# Patient Record
Sex: FEMALE | Race: WHITE | URBAN - METROPOLITAN AREA
[De-identification: names, ages, dates, MRNs, and addresses within clinical notes are randomized per-mention and may not be internally consistent; named-entity substitution may affect disease eponyms.]

---

## 2017-02-09 ENCOUNTER — OFFICE VISIT (OUTPATIENT)
Dept: FAMILY MEDICINE CLINIC | Age: 47
End: 2017-02-09

## 2017-02-09 VITALS
DIASTOLIC BLOOD PRESSURE: 100 MMHG | BODY MASS INDEX: 29.5 KG/M2 | HEIGHT: 64 IN | SYSTOLIC BLOOD PRESSURE: 142 MMHG | TEMPERATURE: 97.8 F | HEART RATE: 81 BPM | OXYGEN SATURATION: 97 % | WEIGHT: 172.8 LBS | RESPIRATION RATE: 18 BRPM

## 2017-02-09 DIAGNOSIS — I10 ESSENTIAL HYPERTENSION WITH GOAL BLOOD PRESSURE LESS THAN 130/80: Primary | ICD-10-CM

## 2017-02-09 DIAGNOSIS — G43.109 MIGRAINE WITH VERTIGO: ICD-10-CM

## 2017-02-09 NOTE — PROGRESS NOTES
HISTORY OF PRESENT ILLNESS  Carmine Roblero is a 51 y.o. female. HPI Comments: Patient  Mentions that her blood pressure has been up lately due to life style changes. She mentions she just finished school and she has not been having much activity and has had 2 occasions where her blood pressure was high. She will make some changes to her life style and monitor her blood pressure. She mentions she has been having migraines most likely due to her bp being high. Hypertension    The history is provided by the patient. This is a chronic problem. The problem has been gradually worsening. Associated symptoms include headaches. Pertinent negatives include no chest pain, no orthopnea, no palpitations, no anxiety, no confusion, no malaise/fatigue, no blurred vision, no tinnitus, no neck pain, no peripheral edema, no dizziness, no shortness of breath, no nausea and no vomiting. Risk factors include hypertension, stress and male gender. Migraine    The history is provided by the patient. This is a chronic problem. The problem occurs constantly. The problem has been gradually worsening. The headache is aggravated by loud noise. The pain is located in the frontal and bilateral region. The quality of the pain is described as sharp and dull. The pain is at a severity of 3/10. Pertinent negatives include no anorexia, no fever, no malaise/fatigue, no chest pressure, no orthopnea, no palpitations, no shortness of breath, no tingling, no dizziness, no nausea and no vomiting. She has tried NSAIDs for the symptoms. Review of Systems   Constitutional: Negative for fever and malaise/fatigue. Weight gain since last visit. HENT: Negative for congestion, ear pain, sore throat and tinnitus. Eyes: Positive for redness. Negative for blurred vision and double vision. Respiratory: Negative for cough, sputum production, shortness of breath and wheezing.     Cardiovascular: Negative for chest pain, palpitations, orthopnea and leg swelling. Gastrointestinal: Negative for abdominal pain, anorexia, constipation, diarrhea, nausea and vomiting. Genitourinary: Negative for dysuria, hematuria and urgency. Musculoskeletal: Negative for joint pain and neck pain. Neurological: Positive for headaches. Negative for dizziness, tingling and focal weakness. Psychiatric/Behavioral: Negative for confusion and depression. The patient is not nervous/anxious. Visit Vitals    BP (!) 142/100    Pulse 81    Temp 97.8 °F (36.6 °C) (Oral)    Resp 18    Ht 5' 4\" (1.626 m)    Wt 172 lb 12.8 oz (78.4 kg)    SpO2 97%    BMI 29.66 kg/m2       Physical Exam   Constitutional: She is oriented to person, place, and time. She appears well-developed and well-nourished. No distress. HENT:   Head: Normocephalic and atraumatic. Right Ear: External ear normal.   Left Ear: External ear normal.   Mouth/Throat: Oropharynx is clear and moist.   Eyes: EOM are normal. Pupils are equal, round, and reactive to light. No scleral icterus. Neck: Normal range of motion. No thyromegaly present. Cardiovascular: Normal rate, regular rhythm and normal heart sounds. Pulmonary/Chest: Effort normal and breath sounds normal. No respiratory distress. She has no wheezes. Abdominal: Soft. Bowel sounds are normal.   Lymphadenopathy:     She has no cervical adenopathy. Neurological: She is alert and oriented to person, place, and time. Psychiatric: She has a normal mood and affect. ASSESSMENT and PLAN  Hypertension :  1) Goal blood pressure less than equal to 140/90 mmHg, goal BP can vary depending on risk factors as discussed. 2) Lifestyle modifications discussed with patient, low sodium <2 gm, low salt , DASH diet  3) Exercise for at least 30 min 3-5 times a week for goal BMI of less than or equal  To 25.  4) Continue current medications as prescribed.   5) Please begin medication as discussed for better blood pressure control, explained side effects and patient verbalized understanding. 6) Goal LDL<100.  7) Please monitor your blood pressure and keep a log to bring in with you at each visit. 8) Discussed risk factors with patient such as CAD, FAST of stroke symptoms, pt verbalizes understanding. 9) Please avoid smoking , alcohol and any illicit drug use if applicable to you.

## 2017-04-06 ENCOUNTER — OFFICE VISIT (OUTPATIENT)
Dept: FAMILY MEDICINE CLINIC | Age: 47
End: 2017-04-06

## 2017-04-06 VITALS
SYSTOLIC BLOOD PRESSURE: 116 MMHG | DIASTOLIC BLOOD PRESSURE: 78 MMHG | HEIGHT: 64 IN | OXYGEN SATURATION: 98 % | RESPIRATION RATE: 20 BRPM | WEIGHT: 173 LBS | TEMPERATURE: 97.8 F | BODY MASS INDEX: 29.53 KG/M2 | HEART RATE: 98 BPM

## 2017-04-06 DIAGNOSIS — I10 ESSENTIAL HYPERTENSION WITH GOAL BLOOD PRESSURE LESS THAN 130/80: Primary | ICD-10-CM

## 2017-04-06 NOTE — PROGRESS NOTES
Chief Complaint   Patient presents with    Hypertension     1. Have you been to the ER, urgent care clinic since your last visit? Hospitalized since your last visit? No    2. Have you seen or consulted any other health care providers outside of the [address] [place] [address] since your last visit? Include any pap smears or colon screening.  No

## 2017-04-06 NOTE — PROGRESS NOTES
HISTORY OF PRESENT ILLNESS  Dora Oh is a 51 y.o. female. HPI Comments: Patient is here for blood pressure follow up. She has been taking her medications and her blood pressure is very good today. Her blood pressures  With her meter has been 120/80 mmhg. Patient has gained some weight and she was inactive for some time and now she is active with a new job. Patient mentions today she has not had caffeine and her allergy medication. Hypertension    The history is provided by the patient. This is a chronic problem. The current episode started less than 1 hour ago. The problem has been gradually improving. Pertinent negatives include no chest pain, no palpitations, no anxiety, no confusion, no malaise/fatigue, no blurred vision, no headaches, no tinnitus, no peripheral edema, no dizziness, no shortness of breath, no nausea and no vomiting. Risk factors include stress and hypertension. Review of Systems   Constitutional: Negative for chills, fever and malaise/fatigue. HENT: Negative for congestion, ear discharge, sore throat and tinnitus. Eyes: Negative for blurred vision, double vision, pain and discharge. Respiratory: Negative for cough, sputum production, shortness of breath and wheezing. Cardiovascular: Negative for chest pain, palpitations and leg swelling. Gastrointestinal: Negative for abdominal pain, diarrhea, nausea and vomiting. Genitourinary: Negative for dysuria, frequency and hematuria. Musculoskeletal: Negative for joint pain and myalgias. Neurological: Negative for dizziness, tingling, focal weakness, weakness and headaches. Endo/Heme/Allergies: Positive for environmental allergies. Psychiatric/Behavioral: Negative for confusion. The patient is not nervous/anxious.       Visit Vitals    /78    Pulse 98    Temp 97.8 °F (36.6 °C) (Oral)    Resp 20    Ht 5' 4\" (1.626 m)    Wt 173 lb (78.5 kg)    SpO2 98%    BMI 29.7 kg/m2       Physical Exam Constitutional: She is oriented to person, place, and time. She appears well-developed and well-nourished. No distress. HENT:   Head: Normocephalic and atraumatic. Right Ear: External ear normal.   Left Ear: External ear normal.   Eyes: EOM are normal. Pupils are equal, round, and reactive to light. No scleral icterus. Neck: Normal range of motion. No thyromegaly present. Cardiovascular: Normal rate, regular rhythm and normal heart sounds. Pulmonary/Chest: Effort normal and breath sounds normal. No respiratory distress. She has no wheezes. Abdominal: Soft. Bowel sounds are normal. She exhibits no distension. There is no tenderness. Lymphadenopathy:     She has no cervical adenopathy. Neurological: She is alert and oriented to person, place, and time. Psychiatric: She has a normal mood and affect. ASSESSMENT and PLAN  Hypertension :  1) Goal blood pressure less than equal to 140/90 mmHg, goal BP can vary depending on risk factors as discussed. 2) Lifestyle modifications discussed with patient, low sodium <2 gm, low salt , DASH diet  3) Exercise for at least 30 min 3-5 times a week for goal BMI of less than or equal  To 25.  4) Continue current medications as prescribed. 5) Please begin medication as discussed for better blood pressure control, explained side effects and patient verbalized understanding. 6) Goal LDL<100.  7) Please monitor your blood pressure and keep a log to bring in with you at each visit. 8) Discussed risk factors with patient such as CAD, FAST of stroke symptoms, pt verbalizes understanding. 9) Please avoid smoking , alcohol and any illicit drug use if applicable to you.

## 2017-04-24 RX ORDER — PROPRANOLOL HYDROCHLORIDE 60 MG/1
CAPSULE, EXTENDED RELEASE ORAL
Qty: 30 CAP | Refills: 4 | Status: SHIPPED | OUTPATIENT
Start: 2017-04-24 | End: 2017-09-27 | Stop reason: SDUPTHER

## 2017-08-03 ENCOUNTER — OFFICE VISIT (OUTPATIENT)
Dept: FAMILY MEDICINE CLINIC | Age: 47
End: 2017-08-03

## 2017-08-03 VITALS
RESPIRATION RATE: 18 BRPM | HEIGHT: 64 IN | BODY MASS INDEX: 29.71 KG/M2 | TEMPERATURE: 97 F | OXYGEN SATURATION: 96 % | DIASTOLIC BLOOD PRESSURE: 75 MMHG | SYSTOLIC BLOOD PRESSURE: 134 MMHG | WEIGHT: 174 LBS | HEART RATE: 71 BPM

## 2017-08-03 DIAGNOSIS — K21.9 GASTROESOPHAGEAL REFLUX DISEASE WITHOUT ESOPHAGITIS: ICD-10-CM

## 2017-08-03 DIAGNOSIS — Z00.00 PHYSICAL EXAM: Primary | ICD-10-CM

## 2017-08-03 DIAGNOSIS — Z13.21 SCREENING FOR ENDOCRINE, NUTRITIONAL, METABOLIC AND IMMUNITY DISORDER: ICD-10-CM

## 2017-08-03 DIAGNOSIS — Z13.0 SCREENING FOR ENDOCRINE, NUTRITIONAL, METABOLIC AND IMMUNITY DISORDER: ICD-10-CM

## 2017-08-03 DIAGNOSIS — I10 ESSENTIAL HYPERTENSION WITH GOAL BLOOD PRESSURE LESS THAN 130/80: ICD-10-CM

## 2017-08-03 DIAGNOSIS — Z13.228 SCREENING FOR ENDOCRINE, NUTRITIONAL, METABOLIC AND IMMUNITY DISORDER: ICD-10-CM

## 2017-08-03 DIAGNOSIS — Z13.29 SCREENING FOR ENDOCRINE, NUTRITIONAL, METABOLIC AND IMMUNITY DISORDER: ICD-10-CM

## 2017-08-03 NOTE — PROGRESS NOTES
HISTORY OF PRESENT ILLNESS  Cortez Boogie is a 51 y.o. female. HPI Comments: Patient is here for  A physical exam. She does see an eye doctor and dentist. She is currently on her period. She has had her PAP smear done some time this year. She is not yet due for a mammogram. She is due to have some blood work done and will [facility]. Her blood pressure is nicely controlled and she mentions at present her heartburn bothers her. I will also order h.pylori testing. Complete Physical   The history is provided by the patient. This is a new problem. The problem occurs constantly. The problem has not changed since onset. Pertinent negatives include no chest pain, no abdominal pain, no headaches and no shortness of breath. Nothing aggravates the symptoms. Nothing relieves the symptoms. She has tried nothing for the symptoms. Patient Active Problem List   Diagnosis Code    Advance care planning Z71.89    Essential hypertension with goal blood pressure less than 130/80 I10    Gastroesophageal reflux disease without esophagitis K21.9    Intractable migraine with aura without status migrainosus G43.119    Migraine with vertigo G43. 109    Intractable migraine without aura and without status migrainosus G43.019     Current Outpatient Prescriptions on File Prior to Visit   Medication Sig Dispense Refill    propranolol LA (INDERAL LA) 60 mg SR capsule TAKE 1 CAPSULE BY MOUTH DAILY 30 Cap 4    pantoprazole (PROTONIX) 40 mg tablet Take 40 mg by mouth daily.  cyclobenzaprine (FLEXERIL) 10 mg tablet Take 0.5 Tabs by mouth nightly. 10 Tab 0    ranitidine (ZANTAC) 150 mg tablet Take 1 Tab by mouth nightly. Indications: GASTROESOPHAGEAL REFLUX 30 Tab 6    esomeprazole (NEXIUM) 20 mg capsule Take  by mouth daily.  MULTIVITS,CA,MINERALS/IRON/FA (MULTIVIT-IRON-FA-CALCIUM-MINS PO) Take  by mouth.  SUMAtriptan (IMITREX) 50 mg tablet Take 50 mg by mouth once as needed for Migraine.       ibuprofen (MOTRIN) 400 mg tablet Take  by mouth every six (6) hours as needed for Pain.  multivitamin (ONE A DAY) tablet Take 1 tablet by mouth daily.  loratadine (CLARITIN) 10 mg tablet Take 10 mg by mouth daily. No current facility-administered medications on file prior to visit. No Known Allergies  Past Medical History:   Diagnosis Date    GERD (gastroesophageal reflux disease)     Migraine     Vascular anomaly     left sided vascular loop    Vertigo      Past Surgical History:   Procedure Laterality Date    HX REFRACTIVE SURGERY       Family History   Problem Relation Age of Onset    Cancer Mother     Heart Disease Father     Cancer Sister      Social History     Social History    Marital status: SINGLE     Spouse name: N/A    Number of children: N/A    Years of education: N/A     Occupational History    Not on file. Social History Main Topics    Smoking status: Never Smoker    Smokeless tobacco: Never Used    Alcohol use No    Drug use: No    Sexual activity: Not Currently     Birth control/ protection: None     Other Topics Concern    Not on file     Social History Narrative       Review of Systems   Constitutional: Negative for chills, diaphoresis, fever and malaise/fatigue. HENT: Negative for congestion, ear pain and sore throat. Eyes: Negative for blurred vision, double vision, pain and discharge. Respiratory: Negative for cough, sputum production, shortness of breath and wheezing. Cardiovascular: Negative for chest pain, palpitations and leg swelling. Gastrointestinal: Positive for heartburn. Negative for abdominal pain, blood in stool, diarrhea, nausea and vomiting. Genitourinary: Negative for dysuria and frequency. Musculoskeletal: Negative for back pain, joint pain and myalgias. Neurological: Negative for dizziness, tingling, weakness and headaches. Psychiatric/Behavioral: The patient is not nervous/anxious and does not have insomnia. Visit Vitals    /75 (BP 1 Location: Left arm, BP Patient Position: Sitting)    Pulse 71    Temp 97 °F (36.1 °C) (Oral)    Resp 18    Ht 5' 4\" (1.626 m)    Wt 174 lb (78.9 kg)    SpO2 96%    BMI 29.87 kg/m2       Physical Exam   Constitutional: She is oriented to person, place, and time. She appears well-developed and well-nourished. No distress. HENT:   Head: Normocephalic and atraumatic. Right Ear: External ear normal.   Left Ear: External ear normal.   Mouth/Throat: Oropharynx is clear and moist. No oropharyngeal exudate. Eyes: EOM are normal. Pupils are equal, round, and reactive to light. Neck: Normal range of motion. No thyromegaly present. Cardiovascular: Normal rate, regular rhythm and normal heart sounds. Pulmonary/Chest: Effort normal and breath sounds normal. No respiratory distress. She has no wheezes. Abdominal: Soft. Bowel sounds are normal. There is no tenderness. Musculoskeletal: Normal range of motion. Lymphadenopathy:     She has no cervical adenopathy. Neurological: She is alert and oriented to person, place, and time. Psychiatric: She has a normal mood and affect. ASSESSMENT and PLAN  Physical exam :  1) Please make sure you have a routine physical exam every 1-2 years. 2) Annual check up with eye doctor and dentist.  3) Annual mammograms for all females starting at age of 36.  3) Self breast exam every month starting at age of 21 and above. 5) Clinical breast exam to be done every 3 years for woman between 20-30 and every year for all woman 36 and above. 6) Pap smear every 3 years starting at age 24( between 24- 27 it may be more often). At the age of 27 to 72  can switch to every 5 years with HPV screening. Woman over the age of 72 with regular cervical cancer testing with normal results no longer need testing. 7) Colorectal cancer screening with colonoscopy every ten years.    8) Bone density testing starting at the age of 72.   5) Routine blood work to be ordered as part of physical exam and has been discussed with patient. 10) Screening for STD's/HIV. 11) Exercise at least 30 min 3-5 times a week for goal BMI of less than or equal to 25.  12) Please make sure you wear a seat belt while driving daily , helmet safety discussed. 13) Please avoid smoking , alcohol and illicit drug use. 14) Daily requirement of calcium is 1200 mg per day and 1000 IU of vitamin D.  15) Please make sure all immunizations are up to date:       - Influenza vaccine every year        - Tdap every 10 years       - Pneumococcal vaccine starting at age of 72       - Shingles at age 61     Gerd:  - Avoid fatty or greasy foods, chocalate , caffeine, mints , or mint-flavored foods,spicy foods, citrus and tomato -based foods.  -Do not lie down right after eating food at any time.  -Do no eat within 3 hours of bedtime.  - Elevate head end of bed .  - Small , frequent meals  through out the day, do no eat large meals.  -Avoid alcohol , smoking.  - Weight loss   - Continue medication as prescribed.

## 2017-08-03 NOTE — PROGRESS NOTES
Patient is here for complete physical.  1. Have you been to the ER, urgent care clinic since your last visit? Hospitalized since your last visit?no    2. Have you seen or consulted any other health care providers outside of the [address] [place] [address] since your last visit? Include any pap smears or colon screening.  no

## 2017-08-06 DIAGNOSIS — K21.9 GASTROESOPHAGEAL REFLUX DISEASE WITHOUT ESOPHAGITIS: ICD-10-CM

## 2017-08-09 RX ORDER — RANITIDINE 150 MG/1
TABLET, FILM COATED ORAL
Qty: 30 TAB | Refills: 4 | Status: SHIPPED | OUTPATIENT
Start: 2017-08-09 | End: 2019-01-28 | Stop reason: SDUPTHER

## 2017-08-12 LAB
25(OH)D3 SERPL-MCNC: 32.3 NG/ML (ref 32–100)
A-G RATIO,AGRAT: 1.4 RATIO (ref 1.1–2.6)
ABSOLUTE LYMPHOCYTE COUNT, 10803: 2.1 K/UL (ref 1–4.8)
ALBUMIN SERPL-MCNC: 4.3 G/DL (ref 3.5–5)
ALP SERPL-CCNC: 77 U/L (ref 25–115)
ALT SERPL-CCNC: 25 U/L (ref 5–40)
ANION GAP SERPL CALC-SCNC: 16 MMOL/L
AST SERPL W P-5'-P-CCNC: 18 U/L (ref 10–37)
BASOPHILS # BLD: 0 K/UL (ref 0–0.2)
BASOPHILS NFR BLD: 1 % (ref 0–2)
BILIRUB SERPL-MCNC: 0.5 MG/DL (ref 0.2–1.2)
BUN SERPL-MCNC: 10 MG/DL (ref 6–22)
CALCIUM SERPL-MCNC: 9.5 MG/DL (ref 8.4–10.5)
CHLORIDE SERPL-SCNC: 98 MMOL/L (ref 98–110)
CHOLEST SERPL-MCNC: 205 MG/DL (ref 110–200)
CO2 SERPL-SCNC: 27 MMOL/L (ref 20–32)
CREAT SERPL-MCNC: 0.8 MG/DL (ref 0.5–1.2)
EOSINOPHIL # BLD: 0.1 K/UL (ref 0–0.5)
EOSINOPHIL NFR BLD: 2 % (ref 0–6)
ERYTHROCYTE [DISTWIDTH] IN BLOOD BY AUTOMATED COUNT: 13.5 % (ref 10–16)
GFRAA, 66117: >60
GFRNA, 66118: >60
GLOBULIN,GLOB: 3 G/DL (ref 2–4)
GLUCOSE SERPL-MCNC: 93 MG/DL (ref 65–99)
GRANULOCYTES,GRANS: 64 % (ref 40–75)
HCT VFR BLD AUTO: 44.5 % (ref 35.1–48)
HDLC SERPL-MCNC: 47 MG/DL (ref 40–59)
HGB BLD-MCNC: 14.3 G/DL (ref 11.7–16)
LDLC SERPL CALC-MCNC: 133 MG/DL (ref 50–99)
LYMPHOCYTES, LYMLT: 27 % (ref 27–45)
MCH RBC QN AUTO: 30 PG (ref 26–34)
MCHC RBC AUTO-ENTMCNC: 32 G/DL (ref 32–36)
MCV RBC AUTO: 93 FL (ref 80–95)
MONOCYTES # BLD: 0.5 K/UL (ref 0.1–0.9)
MONOCYTES NFR BLD: 6 % (ref 3–9)
NEUTROPHILS # BLD AUTO: 5 K/UL (ref 1.8–7.7)
PLATELET # BLD AUTO: 330 K/UL (ref 140–440)
PMV BLD AUTO: 10.6 FL (ref 6–10.8)
POTASSIUM SERPL-SCNC: 4.4 MMOL/L (ref 3.5–5.5)
PROT SERPL-MCNC: 7.3 G/DL (ref 6.4–8.3)
RBC # BLD AUTO: 4.78 M/UL (ref 3.8–5.2)
SODIUM SERPL-SCNC: 141 MMOL/L (ref 133–145)
TRIGL SERPL-MCNC: 124 MG/DL (ref 40–149)
TSH SERPL DL<=0.005 MIU/L-ACNC: 4.18 MCU/ML (ref 0.27–4.2)
VLDLC SERPL CALC-MCNC: 25 MG/DL (ref 8–30)
WBC # BLD AUTO: 7.7 K/UL (ref 4–11)

## 2017-08-14 LAB — H PYLORI AB, IGG,3341A: <0.9 INDEX

## 2017-08-15 LAB — H PYLORI IGA SER IA-ACNC: <9 UNITS

## 2017-08-23 NOTE — TELEPHONE ENCOUNTER
Requested Prescriptions     Pending Prescriptions Disp Refills    pantoprazole (PROTONIX) 40 mg tablet       Sig: Take 1 Tab by mouth daily.

## 2017-08-24 RX ORDER — PANTOPRAZOLE SODIUM 40 MG/1
40 TABLET, DELAYED RELEASE ORAL DAILY
Qty: 30 TAB | Refills: 3 | Status: SHIPPED | OUTPATIENT
Start: 2017-08-24 | End: 2019-01-28 | Stop reason: SDUPTHER

## 2017-09-28 RX ORDER — PROPRANOLOL HYDROCHLORIDE 60 MG/1
CAPSULE, EXTENDED RELEASE ORAL
Qty: 30 CAP | Refills: 3 | Status: SHIPPED | OUTPATIENT
Start: 2017-09-28 | End: 2018-01-09 | Stop reason: SDUPTHER

## 2018-01-18 RX ORDER — PROPRANOLOL HYDROCHLORIDE 60 MG/1
60 CAPSULE, EXTENDED RELEASE ORAL DAILY
Qty: 30 CAP | Refills: 3 | Status: SHIPPED | OUTPATIENT
Start: 2018-01-18 | End: 2019-01-28 | Stop reason: SDUPTHER

## 2018-01-18 NOTE — TELEPHONE ENCOUNTER
From: Brandon Becker  To:  Kalyani Will MD  Sent: 1/9/2018 6:15 PM EST  Subject: Medication Renewal Request    Original authorizing provider: MD Brandon Villaseñor would like a refill of the following medications:  propranolol LA (INDERAL LA) 60 mg SR capsule Kalyani Will MD]    Preferred pharmacy: [id number] - [place], [address] [place] [address]    Comment:

## 2018-01-24 ENCOUNTER — TELEPHONE (OUTPATIENT)
Dept: FAMILY MEDICINE CLINIC | Age: 48
End: 2018-01-24

## 2018-01-24 NOTE — TELEPHONE ENCOUNTER
Pt requesting to speak with [facility], [address].  Pt did not state what call is in reference to

## 2018-01-25 NOTE — TELEPHONE ENCOUNTER
Spoke with patient, patient was concerned about what type of symptoms she should look for regarding flu. Information given.

## 2018-02-02 ENCOUNTER — OFFICE VISIT (OUTPATIENT)
Dept: FAMILY MEDICINE CLINIC | Age: 48
End: 2018-02-02

## 2018-02-02 VITALS
SYSTOLIC BLOOD PRESSURE: 125 MMHG | HEART RATE: 68 BPM | HEIGHT: 64 IN | WEIGHT: 171 LBS | DIASTOLIC BLOOD PRESSURE: 81 MMHG | TEMPERATURE: 97.9 F | OXYGEN SATURATION: 96 % | BODY MASS INDEX: 29.19 KG/M2 | RESPIRATION RATE: 18 BRPM

## 2018-02-02 DIAGNOSIS — I10 ESSENTIAL HYPERTENSION WITH GOAL BLOOD PRESSURE LESS THAN 130/80: Primary | ICD-10-CM

## 2018-02-02 DIAGNOSIS — R05.8 COUGH WITH EXPECTORATION: ICD-10-CM

## 2018-02-02 DIAGNOSIS — Z20.828 EXPOSURE TO THE FLU: ICD-10-CM

## 2018-02-02 DIAGNOSIS — J40 BRONCHITIS: ICD-10-CM

## 2018-02-02 LAB
QUICKVUE INFLUENZA TEST: NEGATIVE
VALID INTERNAL CONTROL?: YES

## 2018-02-02 RX ORDER — SUMATRIPTAN 100 MG/1
TABLET, FILM COATED ORAL
Refills: 5 | COMMUNITY
Start: 2017-12-26

## 2018-02-02 RX ORDER — FLUTICASONE PROPIONATE 50 MCG
SPRAY, SUSPENSION (ML) NASAL
Refills: 5 | COMMUNITY
Start: 2017-11-27

## 2018-02-02 RX ORDER — AZITHROMYCIN 250 MG/1
TABLET, FILM COATED ORAL
Qty: 6 TAB | Refills: 0 | Status: SHIPPED | OUTPATIENT
Start: 2018-02-02 | End: 2018-02-07

## 2018-02-02 RX ORDER — HYDROCODONE POLISTIREX AND CHLORPHENIRAMINE POLISTIREX 10; 8 MG/5ML; MG/5ML
1 SUSPENSION, EXTENDED RELEASE ORAL
Qty: 115 ML | Refills: 0 | Status: SHIPPED | OUTPATIENT
Start: 2018-02-02

## 2018-02-02 NOTE — PROGRESS NOTES
HISTORY OF PRESENT ILLNESS  Reginald Walker is a 49 y.o. female. Cough   The history is provided by the patient. This is a new problem. The current episode started more than 2 days ago. The problem occurs constantly. The problem has been gradually worsening. Associated symptoms include shortness of breath. Pertinent negatives include no chest pain, no abdominal pain and no headaches. The symptoms are aggravated by stress and coughing. Nothing relieves the symptoms. She has tried nothing for the symptoms. Hypertension    The history is provided by the patient. This is a chronic problem. The problem has been gradually improving. Associated symptoms include shortness of breath. Pertinent negatives include no chest pain, no palpitations, no PND, no anxiety, no confusion, no malaise/fatigue, no headaches, no neck pain, no peripheral edema, no dizziness and no vomiting. Risk factors include stress and hypertension. Review of Systems   Constitutional: Negative for chills, fever and malaise/fatigue. HENT: Positive for congestion, sinus pain and sore throat. Negative for hearing loss and nosebleeds. Respiratory: Positive for cough, sputum production and shortness of breath. Cardiovascular: Negative for chest pain, palpitations and PND. Gastrointestinal: Negative for abdominal pain, constipation, diarrhea and vomiting. Genitourinary: Negative for dysuria and frequency. Musculoskeletal: Negative for joint pain and neck pain. Neurological: Negative for dizziness, tingling, focal weakness, weakness and headaches. Psychiatric/Behavioral: Negative for confusion and depression. The patient is not nervous/anxious. Visit Vitals    /81    Pulse 68    Temp 97.9 °F (36.6 °C) (Oral)    Resp 18    Ht 5' 4\" (1.626 m)    Wt 171 lb (77.6 kg)    SpO2 96%    BMI 29.35 kg/m2       Physical Exam   Constitutional: She is oriented to person, place, and time. She appears well-developed and well-nourished. No distress. HENT:   Head: Normocephalic and atraumatic. Right Ear: External ear normal.   Left Ear: External ear normal.   Mouth/Throat: Posterior oropharyngeal erythema present. Eyes: EOM are normal. Pupils are equal, round, and reactive to light. No scleral icterus. Neck: Normal range of motion. No thyromegaly present. Cardiovascular: Normal rate, regular rhythm and normal heart sounds. Pulmonary/Chest: Effort normal and breath sounds normal. No respiratory distress. She has no wheezes. Abdominal: Soft. Bowel sounds are normal. She exhibits no distension. Lymphadenopathy:     She has no cervical adenopathy. Neurological: She is alert and oriented to person, place, and time. Psychiatric: She has a normal mood and affect. ASSESSMENT and PLAN  Hypertension :  1) Goal blood pressure less than equal to 140/90 mmHg, goal BP can vary depending on risk factors as discussed. 2) Lifestyle modifications discussed with patient, low sodium <2 gm, low salt , DASH diet  3) Exercise for at least 30 min 3-5 times a week for goal BMI of less than or equal  To 25.  4) Continue current medications as prescribed. 5) Please begin medication as discussed for better blood pressure control, explained side effects and patient verbalized understanding. 6) Goal LDL<100.  7) Please monitor your blood pressure and keep a log to bring in with you at each visit. 8) Discussed risk factors with patient such as CAD, FAST of stroke symptoms, pt verbalizes understanding. 9) Please avoid smoking , alcohol and any illicit drug use if applicable to you. 10) Discussed lifestyle modifications ,dietary control and BP monitoring at home     Cough / sinusitis :  1) Ok to take tylenol / motrin to relieve pain. 2) Please finish course of antibiotics , explained side effects and patient verbalizes understanding. 3) Mucolytic's such as guaifenesin which can thin out the mucous.   4) Use nasal steroid inhaler and anti-allergy medication. 5) Can use nasal saline spray or Neti-pot. 6) Please keep a humidifier in your bedroom. 7) Patient instructions and information on diagnosis to be handed out.   Point of care testing for influenza is negative

## 2018-02-02 NOTE — PROGRESS NOTES
Chief Complaint   Patient presents with    Cough    Hypertension     1. Have you been to the ER, urgent care clinic since your last visit? Hospitalized since your last visit? No    2. Have you seen or consulted any other health care providers outside of the [address] [place] [address] since your last visit? Include any pap smears or colon screening.  No

## 2018-04-26 ENCOUNTER — TELEPHONE (OUTPATIENT)
Dept: FAMILY MEDICINE CLINIC | Age: 48
End: 2018-04-26

## 2018-04-26 NOTE — TELEPHONE ENCOUNTER
Pt called & stated she has had a persistent cough since January when she had bronchitis. She is scheduled for an appt tomorrow.

## 2018-04-27 ENCOUNTER — OFFICE VISIT (OUTPATIENT)
Dept: FAMILY MEDICINE CLINIC | Age: 48
End: 2018-04-27

## 2018-04-27 VITALS
WEIGHT: 175 LBS | SYSTOLIC BLOOD PRESSURE: 118 MMHG | DIASTOLIC BLOOD PRESSURE: 85 MMHG | RESPIRATION RATE: 16 BRPM | BODY MASS INDEX: 29.88 KG/M2 | TEMPERATURE: 97.5 F | HEIGHT: 64 IN | HEART RATE: 84 BPM | OXYGEN SATURATION: 98 %

## 2018-04-27 DIAGNOSIS — R05.3 COUGH, PERSISTENT: ICD-10-CM

## 2018-04-27 DIAGNOSIS — R05.3 COUGH, PERSISTENT: Primary | ICD-10-CM

## 2018-04-27 RX ORDER — ALBUTEROL SULFATE 90 UG/1
1 AEROSOL, METERED RESPIRATORY (INHALATION)
Qty: 1 INHALER | Refills: 0 | Status: SHIPPED | OUTPATIENT
Start: 2018-04-27 | End: 2018-04-27 | Stop reason: SDUPTHER

## 2018-04-27 RX ORDER — ALBUTEROL SULFATE 90 UG/1
1 AEROSOL, METERED RESPIRATORY (INHALATION)
Qty: 1 INHALER | Refills: 0 | Status: SHIPPED | OUTPATIENT
Start: 2018-04-27

## 2018-04-27 RX ORDER — METHYLPREDNISOLONE 4 MG/1
TABLET ORAL
Qty: 1 DOSE PACK | Refills: 0 | Status: SHIPPED | OUTPATIENT
Start: 2018-04-27

## 2018-04-27 RX ORDER — METHYLPREDNISOLONE 4 MG/1
TABLET ORAL
Qty: 1 DOSE PACK | Refills: 0 | Status: SHIPPED | OUTPATIENT
Start: 2018-04-27 | End: 2018-04-27 | Stop reason: SDUPTHER

## 2018-04-27 NOTE — PROGRESS NOTES
HISTORY OF PRESENT ILLNESS  Cortez Boogie is a 49 y.o. female. HPI Comments: Patient mentions she has been having persistent cough which has not been going away since january. She did better after antibiotics and cough syrup but her sinusitis is persistent. She has had improvement with a steroid pack in the past and I will prescribe this again but I have advised her to use Flonase and a allergy medication. She will also discuss to make an appointment with ENT. She has had pulmonary work up in past which was negative and she mentions on and off when she gets into a coughing spell she gets shortness of breathe. Cough   The history is provided by the patient. This is a chronic problem. The problem occurs constantly. The problem has been gradually worsening. Associated symptoms include shortness of breath. Pertinent negatives include no chest pain, no abdominal pain and no headaches. The symptoms are aggravated by stress and coughing. She has tried nothing for the symptoms. Review of Systems   Constitutional: Negative for chills, diaphoresis, fever and malaise/fatigue. HENT: Positive for congestion and sinus pain. Negative for ear pain, hearing loss and sore throat. Eyes: Negative for blurred vision, double vision, pain and discharge. Respiratory: Positive for cough and shortness of breath. Negative for wheezing. Cardiovascular: Negative for chest pain, claudication and leg swelling. Gastrointestinal: Negative for abdominal pain, diarrhea, nausea and vomiting. Genitourinary: Negative for dysuria, frequency and hematuria. Musculoskeletal: Negative for joint pain. Neurological: Negative for dizziness, tingling, tremors, focal weakness and headaches. Psychiatric/Behavioral: Negative for depression. The patient is not nervous/anxious.       Visit Vitals    /85    Pulse 84    Temp 97.5 °F (36.4 °C) (Oral)    Resp 16    Ht 5' 4\" (1.626 m)    Wt 175 lb (79.4 kg)    SpO2 98%    BMI 30.04 kg/m2       Physical Exam   Constitutional: She is oriented to person, place, and time. She appears well-developed and well-nourished. No distress. HENT:   Head: Normocephalic and atraumatic. Right Ear: External ear normal.   Left Ear: External ear normal.   Mouth/Throat: Oropharynx is clear and moist. No oropharyngeal exudate. Eyes: EOM are normal. Pupils are equal, round, and reactive to light. No scleral icterus. Neck: Normal range of motion. No thyromegaly present. Cardiovascular: Normal rate, regular rhythm and normal heart sounds. Pulmonary/Chest: Effort normal and breath sounds normal. No respiratory distress. She has no wheezes. Abdominal: Soft. Bowel sounds are normal. She exhibits no distension. There is no tenderness. Lymphadenopathy:     She has no cervical adenopathy. Neurological: She is alert and oriented to person, place, and time. Psychiatric: She has a normal mood and affect. ASSESSMENT and PLAN  Persistent cough :  1) Ok to take tylenol / motrin to relieve pain. 2) Please finish course of antibiotics , explained side effects and patient verbalizes understanding. 3) Mucolytic's such as guaifenesin which can thin out the mucous. 4) Use nasal steroid inhaler and anti-allergy medication. 5) Can use nasal saline spray or Neti-pot. 6) Please keep a humidifier in your bedroom. 7) Patient instructions and information on diagnosis to be handed out.     Please make an appointment to see ENT  Steroid pack   Allergy testing has been ordered

## 2018-04-27 NOTE — PROGRESS NOTES
Patient is here for persistant cough that will not go away. 1. Have you been to the ER, urgent care clinic since your last visit? Hospitalized since your last visit? no    2. Have you seen or consulted any other health care providers outside of the [facility] since your last visit? Include any pap smears or colon screening.  no

## 2018-04-30 ENCOUNTER — TELEPHONE (OUTPATIENT)
Dept: FAMILY MEDICINE CLINIC | Age: 48
End: 2018-04-30

## 2018-04-30 ENCOUNTER — LAB ONLY (OUTPATIENT)
Dept: FAMILY MEDICINE CLINIC | Age: 48
End: 2018-04-30

## 2018-04-30 DIAGNOSIS — Z01.89 ROUTINE LAB DRAW: Primary | ICD-10-CM

## 2018-04-30 DIAGNOSIS — R05.3 CHRONIC COUGH: Primary | ICD-10-CM

## 2018-04-30 NOTE — TELEPHONE ENCOUNTER
Pt states was in for appt on fri 04/26/17. Pt states was told by Dr Annika Perla will require a chest xray. Pt states will be in the office today to have xrays done. Please place xray order for chest xray.

## 2018-05-03 LAB
ALTERNARIA ALTERNATA,M6A: <0.35 KU/L
ASPERGILLUS FUMIGATUS, 164286: <0.35 KU/L
BERMUDA GRASS,G2A: <0.35 KU/L
BIRCH,TRE1: <0.35 KU/L
CAT DANDER IGE, 805296: <0.35 KU/L
CLADOSPORIUM HERBARU,M2A: <0.35 KU/L
COCKROACH,I6A: <0.35 KU/L
COTTONWOOD,INH9: <0.35 KU/L
D001 D PTERONYSSINUS, 610725: <0.35 KU/L
D002 D FARINAE MITE, 069211: <0.35 KU/L
DOG DANDER,INH27: <0.35 KU/L
ELM,INH10: <0.35 KU/L
IMMUNOGLOBULIN E,TOTAL, 002173: 44 KU/L (ref 0–114)
JOHNSON GRASS,G10A: <0.35 KU/L
MAPLE/BOX ELDER,TRE3: <0.35 KU/L
MOUSE URINE PROTEINS (E72) IGE: <0.35 KU/L
MULBERRY,T70A: <0.35 KU/L
OAK,INH6: <0.35 KU/L
PECAN,FOO16: <0.35 KU/L
PENICILLIUM NOTATUM,INH3: <0.35 KU/L
PIGWEED,W14A: <0.35 KU/L
SHEEP SORREL, WEE15: <0.35 KU/L
T006 CEDAR, MOUNTAIN, 068650: <0.35 KU/L
TIMOTHY GRASS, 068890: <0.35 KU/L
W001 RAGWEED, SHORT/COMMON, 069013: <0.35 KU/L

## 2018-05-04 ENCOUNTER — TELEPHONE (OUTPATIENT)
Dept: FAMILY MEDICINE CLINIC | Age: 48
End: 2018-05-04

## 2018-05-04 NOTE — TELEPHONE ENCOUNTER
Pt requesting a spacer for albuterol treatment. States was rx'd Albuterol inhaler. States medication just sits in her mouth. States was told by pharmacist to contact pcp for rx for Spacer. Advised pt Dr. Annika Perla not in the office but will send the message. Pt requesting another provider send in rx for spacer.

## 2018-05-07 RX ORDER — ALBUTEROL SULFATE 0.83 MG/ML
2.5 SOLUTION RESPIRATORY (INHALATION) ONCE
Qty: 24 EACH | Refills: 0 | Status: SHIPPED | OUTPATIENT
Start: 2018-05-07 | End: 2018-05-07

## 2018-09-06 ENCOUNTER — OFFICE VISIT (OUTPATIENT)
Dept: FAMILY MEDICINE CLINIC | Age: 48
End: 2018-09-06

## 2018-09-06 VITALS
SYSTOLIC BLOOD PRESSURE: 130 MMHG | BODY MASS INDEX: 29.53 KG/M2 | HEART RATE: 75 BPM | RESPIRATION RATE: 16 BRPM | HEIGHT: 64 IN | WEIGHT: 173 LBS | TEMPERATURE: 98 F | DIASTOLIC BLOOD PRESSURE: 81 MMHG | OXYGEN SATURATION: 95 %

## 2018-09-06 DIAGNOSIS — R05.8 RECURRENT DRY COUGH: Primary | ICD-10-CM

## 2018-09-06 DIAGNOSIS — K21.9 GASTROESOPHAGEAL REFLUX DISEASE WITHOUT ESOPHAGITIS: ICD-10-CM

## 2018-09-06 DIAGNOSIS — M25.50 MULTIPLE JOINT PAIN: ICD-10-CM

## 2018-09-06 DIAGNOSIS — R05.8 RECURRENT DRY COUGH: ICD-10-CM

## 2018-09-06 RX ORDER — HYDROGEN PEROXIDE 3 %
40 SOLUTION, NON-ORAL MISCELLANEOUS 2 TIMES DAILY
Qty: 90 CAP | Refills: 3 | Status: SHIPPED | OUTPATIENT
Start: 2018-09-06 | End: 2019-11-04

## 2018-09-06 RX ORDER — CYCLOBENZAPRINE HCL 10 MG
5 TABLET ORAL
Qty: 10 TAB | Refills: 0 | Status: SHIPPED | OUTPATIENT
Start: 2018-09-06 | End: 2019-11-04 | Stop reason: SDUPTHER

## 2018-09-10 NOTE — PROGRESS NOTES
HISTORY OF PRESENT ILLNESS Arvind Powers is a 49 y.o. female. HPI Comments: Patient is here today as she mentions her cough is still persistent and she is not sure if she is allergic to something. I have discussed reordering allergy testing today and that she should continue her GERD medications. She has also had a nasal scope procedure done with ENT who told her nothing was wrong. Cough The history is provided by the patient. This is a chronic problem. The problem occurs constantly. The problem has been gradually worsening. Pertinent negatives include no chest pain, no abdominal pain, no headaches and no shortness of breath. The symptoms are aggravated by stress and coughing. Nothing relieves the symptoms. Treatments tried: patient has tried allergy medications and steroid pack. Review of Systems Constitutional: Negative for fever. HENT: Negative for congestion, hearing loss, sinus pain and sore throat. Eyes: Negative for blurred vision and pain. Respiratory: Positive for cough. Negative for sputum production, shortness of breath, wheezing and stridor. Cardiovascular: Negative for chest pain and palpitations. Gastrointestinal: Positive for heartburn. Negative for abdominal pain, constipation, diarrhea and nausea. Genitourinary: Negative for dysuria. Neurological: Negative for dizziness, speech change and headaches. Psychiatric/Behavioral: Negative for depression. The patient is not nervous/anxious. Visit Vitals  /81  Pulse 75  Temp 98 °F (36.7 °C) (Oral)  Resp 16  
 Ht 5' 4\" (1.626 m)  Wt 173 lb (78.5 kg)  SpO2 95%  BMI 29.7 kg/m2 Physical Exam  
Constitutional: She is oriented to person, place, and time. She appears well-developed and well-nourished. No distress. HENT:  
Head: Normocephalic and atraumatic.   
Right Ear: External ear normal.  
Left Ear: External ear normal.  
 Mouth/Throat: Oropharynx is clear and moist. No oropharyngeal exudate. Eyes: EOM are normal. Pupils are equal, round, and reactive to light. No scleral icterus. Neck: Normal range of motion. No thyromegaly present. Cardiovascular: Normal rate, regular rhythm and normal heart sounds. Pulmonary/Chest: Effort normal and breath sounds normal. No respiratory distress. She has no wheezes. Abdominal: Soft. Bowel sounds are normal. She exhibits no distension. There is no tenderness. Lymphadenopathy:  
  She has no cervical adenopathy. Neurological: She is alert and oriented to person, place, and time. Psychiatric: She has a normal mood and affect. ASSESSMENT and PLAN Recurrent cough : 
- Please come back for testing Gerd : 
- Avoid fatty or greasy foods, chocalate , caffeine, mints , or mint-flavored foods,spicy foods, citrus and tomato -based foods. 
-Do not lie down right after eating food at any time. 
-Do no eat within 3 hours of bedtime. 
- Elevate head end of bed . - Small , frequent meals  through out the day, do no eat large meals. 
-Avoid alcohol , smoking. 
- Weight loss - Continue medication as prescribed.

## 2018-09-19 LAB
ALTERNARIA ALTERNATA,M6A: <0.35 KU/L
ASPERGILLUS FUMIGATUS, 164286: <0.35 KU/L
BERMUDA GRASS,G2A: <0.35 KU/L
BIRCH,TRE1: <0.35 KU/L
CAT DANDER IGE, 805296: <0.35 KU/L
CLADOSPORIUM HERBARU,M2A: <0.35 KU/L
COCKROACH,I6A: <0.35 KU/L
CODFISH,FOOD7: <0.35 KU/L
CORN,F8A: <0.35 KU/L
COTTONWOOD,INH9: <0.35 KU/L
D001 D PTERONYSSINUS, 610725: <0.35 KU/L
D002 D FARINAE MITE, 069211: <0.35 KU/L
DOG DANDER,INH27: <0.35 KU/L
EGG WHITE,F1A: <0.35 KU/L
ELM,INH10: <0.35 KU/L
IMMUNOGLOBULIN E,TOTAL, 002173: 23 KU/L (ref 0–114)
IMMUNOGLOBULIN E,TOTAL, 002173: 23 KU/L (ref 0–114)
JOHNSON GRASS,G10A: <0.35 KU/L
Lab: <0.35 KU/L
MAPLE/BOX ELDER,TRE3: <0.35 KU/L
MILK,FOOD1: <0.35 KU/L
MOUSE URINE PROTEINS (E72) IGE: <0.35 KU/L
MULBERRY,T70A: <0.35 KU/L
OAK,INH6: <0.35 KU/L
PEANUT, F13A: <0.35 KU/L
PECAN,FOO16: <0.35 KU/L
PENICILLIUM NOTATUM,INH3: <0.35 KU/L
PIGWEED,W14A: <0.35 KU/L
SCALLOP, 069815, FOO56L: <0.35 KU/L
SHEEP SORREL, WEE15: <0.35 KU/L
SHRIMP,F24A: <0.35 KU/L
SOY,F14A: <0.35 KU/L
T006 CEDAR, MOUNTAIN, 068650: <0.35 KU/L
TIMOTHY GRASS, 068890: <0.35 KU/L
W001 RAGWEED, SHORT/COMMON, 069013: <0.35 KU/L
WALNUT: <0.35 KU/L
WHEAT,F4A: <0.35 KU/L

## 2018-09-20 ENCOUNTER — TELEPHONE (OUTPATIENT)
Dept: FAMILY MEDICINE CLINIC | Age: 48
End: 2018-09-20

## 2018-12-04 ENCOUNTER — TELEPHONE (OUTPATIENT)
Dept: FAMILY MEDICINE CLINIC | Age: 48
End: 2018-12-04

## 2018-12-04 NOTE — TELEPHONE ENCOUNTER
Received fax transmission from [address] [place] [address] [facility] New Patient Consult Note. Sent to be signed, sent, and scanned.

## 2018-12-10 ENCOUNTER — TELEPHONE (OUTPATIENT)
Dept: FAMILY MEDICINE CLINIC | Age: 48
End: 2018-12-10

## 2018-12-10 NOTE — TELEPHONE ENCOUNTER
Received fax transmission from [address] [place] [address] [facility] of Office Visit Note. Sent to be signed, sent, and scanned.

## 2019-01-28 DIAGNOSIS — K21.9 GASTROESOPHAGEAL REFLUX DISEASE WITHOUT ESOPHAGITIS: ICD-10-CM

## 2019-01-28 NOTE — TELEPHONE ENCOUNTER
Pt states takes protonix twice daily  Requested Prescriptions     Pending Prescriptions Disp Refills    pantoprazole (PROTONIX) 40 mg tablet 30 Tab 3     Sig: Take 1 Tab by mouth daily.  propranolol LA (INDERAL LA) 60 mg SR capsule 30 Cap 3     Sig: Take 1 Cap by mouth daily.     raNITIdine (ZANTAC) 150 mg tablet 30 Tab 4

## 2019-01-29 RX ORDER — RANITIDINE 150 MG/1
TABLET, FILM COATED ORAL
Qty: 30 TAB | Refills: 4 | Status: SHIPPED | OUTPATIENT
Start: 2019-01-29 | End: 2019-04-02 | Stop reason: SDUPTHER

## 2019-01-29 RX ORDER — PANTOPRAZOLE SODIUM 40 MG/1
40 TABLET, DELAYED RELEASE ORAL 2 TIMES DAILY
Qty: 60 TAB | Refills: 3 | Status: SHIPPED | OUTPATIENT
Start: 2019-01-29 | End: 2019-04-02 | Stop reason: SDUPTHER

## 2019-01-29 RX ORDER — PROPRANOLOL HYDROCHLORIDE 60 MG/1
60 CAPSULE, EXTENDED RELEASE ORAL DAILY
Qty: 30 CAP | Refills: 3 | Status: SHIPPED | OUTPATIENT
Start: 2019-01-29 | End: 2019-04-02 | Stop reason: SDUPTHER

## 2019-04-02 DIAGNOSIS — K21.9 GASTROESOPHAGEAL REFLUX DISEASE WITHOUT ESOPHAGITIS: ICD-10-CM

## 2019-04-02 NOTE — TELEPHONE ENCOUNTER
Requested Prescriptions     Pending Prescriptions Disp Refills    propranolol LA (INDERAL LA) 60 mg SR capsule 30 Cap 3     Sig: Take 1 Cap by mouth daily.  pantoprazole (PROTONIX) 40 mg tablet 60 Tab 3     Sig: Take 1 Tab by mouth two (2) times a day.     raNITIdine (ZANTAC) 150 mg tablet 30 Tab 4     Sig: TAKE 1 TABLET BY MOUTH NIGHTLY

## 2019-04-03 RX ORDER — RANITIDINE 150 MG/1
TABLET, FILM COATED ORAL
Qty: 30 TAB | Refills: 4 | Status: SHIPPED | OUTPATIENT
Start: 2019-04-03 | End: 2019-11-04

## 2019-04-03 RX ORDER — PROPRANOLOL HYDROCHLORIDE 60 MG/1
60 CAPSULE, EXTENDED RELEASE ORAL DAILY
Qty: 30 CAP | Refills: 3 | Status: SHIPPED | OUTPATIENT
Start: 2019-04-03 | End: 2019-04-25 | Stop reason: SDUPTHER

## 2019-04-03 RX ORDER — PANTOPRAZOLE SODIUM 40 MG/1
40 TABLET, DELAYED RELEASE ORAL 2 TIMES DAILY
Qty: 60 TAB | Refills: 3 | Status: SHIPPED | OUTPATIENT
Start: 2019-04-03 | End: 2019-08-08 | Stop reason: SDUPTHER

## 2019-04-09 ENCOUNTER — TELEPHONE (OUTPATIENT)
Dept: FAMILY MEDICINE CLINIC | Age: 49
End: 2019-04-09

## 2019-04-15 NOTE — TELEPHONE ENCOUNTER
Patient states that she does not need a refill of Phenergan.   States that she just got a refill from her neurologist.

## 2019-04-15 NOTE — TELEPHONE ENCOUNTER
Left a message for patient to call back. Need to see if patient is wanting a refill of phenergan and if so, patient needs to schedule appt with provider for refill. Patient has not been seen since last on 9/2018.

## 2019-04-17 ENCOUNTER — OFFICE VISIT (OUTPATIENT)
Dept: FAMILY MEDICINE CLINIC | Age: 49
End: 2019-04-17

## 2019-04-25 ENCOUNTER — TELEPHONE (OUTPATIENT)
Dept: FAMILY MEDICINE CLINIC | Age: 49
End: 2019-04-25

## 2019-04-25 RX ORDER — PROPRANOLOL HYDROCHLORIDE 60 MG/1
60 CAPSULE, EXTENDED RELEASE ORAL DAILY
Qty: 90 CAP | Refills: 2 | Status: SHIPPED | OUTPATIENT
Start: 2019-04-25

## 2019-04-25 NOTE — TELEPHONE ENCOUNTER
Pt called office very upset due to has been trying to schedule an appt with any provider for over 3 weeks now. Pt states has been having swollen legs. 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 States however can be congestive heart failure. Pt states if it gets any worse she will go to the ER. Pt states not sure what to do/ pt states she feels that she does not have access to a provider when she need it. She cannot see Dr Yvon Siddiqui due to she has no afternoon availability, Dr Roxy Duavl is leaving the practice. [facility] has scheduled an appt with Ana Matos PA-C but her appt was cancelled due to she was out sick yesterday. 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 Pt states will call back to schedule an appt.

## 2019-04-25 NOTE — TELEPHONE ENCOUNTER
Per [facility] care dario requesting a 90 day supply  Requested Prescriptions     Pending Prescriptions Disp Refills    propranolol LA (INDERAL LA) 60 mg SR capsule 30 Cap 3     Sig: Take 1 Cap by mouth daily.

## 2019-04-26 NOTE — TELEPHONE ENCOUNTER
Left message for patient to see how she is feeling. Requested that patient call back to schedule an appt to be seen. Chart routed to provider and  for review.

## 2019-04-29 ENCOUNTER — TELEPHONE (OUTPATIENT)
Dept: FAMILY MEDICINE CLINIC | Age: 49
End: 2019-04-29

## 2019-08-08 RX ORDER — PANTOPRAZOLE SODIUM 40 MG/1
TABLET, DELAYED RELEASE ORAL
Qty: 60 TAB | Refills: 3 | Status: SHIPPED | OUTPATIENT
Start: 2019-08-08

## 2019-08-29 DIAGNOSIS — K21.9 GASTROESOPHAGEAL REFLUX DISEASE WITHOUT ESOPHAGITIS: ICD-10-CM

## 2019-08-29 RX ORDER — RANITIDINE 150 MG/1
TABLET, FILM COATED ORAL
Qty: 30 TAB | Refills: 4 | Status: CANCELLED | OUTPATIENT
Start: 2019-08-29

## 2019-08-30 NOTE — TELEPHONE ENCOUNTER
Is this still our patient? She has not had a visit in our office for a year. Thank you. Luanne Chi PA-C  [facility]  [phone]  [address]

## 2019-11-04 ENCOUNTER — OFFICE VISIT (OUTPATIENT)
Dept: FAMILY MEDICINE CLINIC | Age: 49
End: 2019-11-04

## 2019-11-04 VITALS
WEIGHT: 176 LBS | TEMPERATURE: 97.5 F | RESPIRATION RATE: 18 BRPM | HEIGHT: 64 IN | DIASTOLIC BLOOD PRESSURE: 85 MMHG | OXYGEN SATURATION: 95 % | BODY MASS INDEX: 30.05 KG/M2 | HEART RATE: 75 BPM | SYSTOLIC BLOOD PRESSURE: 124 MMHG

## 2019-11-04 DIAGNOSIS — M79.662 PAIN IN BOTH LOWER LEGS: ICD-10-CM

## 2019-11-04 DIAGNOSIS — M25.50 CHRONIC PAIN OF MULTIPLE JOINTS: Primary | ICD-10-CM

## 2019-11-04 DIAGNOSIS — K21.9 GASTROESOPHAGEAL REFLUX DISEASE WITHOUT ESOPHAGITIS: ICD-10-CM

## 2019-11-04 DIAGNOSIS — G89.29 CHRONIC PAIN OF MULTIPLE JOINTS: ICD-10-CM

## 2019-11-04 DIAGNOSIS — I10 ESSENTIAL HYPERTENSION WITH GOAL BLOOD PRESSURE LESS THAN 130/80: ICD-10-CM

## 2019-11-04 DIAGNOSIS — M79.661 PAIN IN BOTH LOWER LEGS: ICD-10-CM

## 2019-11-04 DIAGNOSIS — M25.50 CHRONIC PAIN OF MULTIPLE JOINTS: ICD-10-CM

## 2019-11-04 DIAGNOSIS — G89.29 CHRONIC PAIN OF MULTIPLE JOINTS: Primary | ICD-10-CM

## 2019-11-04 RX ORDER — CYCLOBENZAPRINE HCL 10 MG
5 TABLET ORAL
Qty: 60 TAB | Refills: 0 | Status: SHIPPED | OUTPATIENT
Start: 2019-11-04

## 2019-11-04 RX ORDER — ZINC GLUCONATE 13.3 MG
200 LOZENGE ORAL
Qty: 90 TAB | Refills: 0 | Status: SHIPPED | OUTPATIENT
Start: 2019-11-04

## 2019-11-04 NOTE — PROGRESS NOTES
Chief Complaint   Patient presents with    Medication Refill     flexeril    Pain (Chronic)     lt knee, bilateral foot pain, hips, lower back, rt shoulder/arm pain    Medication Evaluation     Zantac recall     . .1. Have you been to the ER, urgent care clinic since your last visit? Hospitalized since your last visit? Yes Where: [facility]    2. Have you seen or consulted any other health care providers outside of the [address] [place] [address] since your last visit? Include any pap smears or colon screening.  Yes Reason for visit: migraine

## 2019-11-04 NOTE — PROGRESS NOTES
HISTORY OF PRESENT ILLNESS  Jessica Pastrana is a 51 y.o. female. Patient is here for her follow up. She has been having multiple joint pain. She mentions she takes a flexeril and she feels better. She mentions she is aware she is overweight and will need to loose weight. I have also discussed her to stop zantac. I have discussed cimetidine in this place. She is also due to have some blood work. Medication Refill   The history is provided by the patient. This is a new problem. The problem occurs constantly. The problem has not changed since onset. Pertinent negatives include no chest pain, no abdominal pain, no headaches and no shortness of breath. Nothing aggravates the symptoms. Nothing relieves the symptoms. She has tried nothing for the symptoms. Medication Evaluation   The history is provided by the patient. This is a new problem. Pertinent negatives include no chest pain, no abdominal pain, no headaches and no shortness of breath. Hypertension    The history is provided by the patient. This is a chronic problem. The problem has been gradually improving. Pertinent negatives include no chest pain, no palpitations, no anxiety, no confusion, no malaise/fatigue, no blurred vision, no headaches, no neck pain, no dizziness, no shortness of breath, no nausea and no vomiting. Risk factors include hypertension and stress. GERD   The history is provided by the patient. This is a chronic problem. The problem occurs constantly. The problem has been gradually improving. Pertinent negatives include no chest pain, no abdominal pain, no headaches and no shortness of breath. The symptoms are aggravated by stress and eating. The symptoms are relieved by medications. Treatments tried: on meds. The treatment provided moderate relief. Review of Systems   Constitutional: Negative for chills, diaphoresis, fever and malaise/fatigue. HENT: Negative for congestion, ear discharge, ear pain, hearing loss and sinus pain. Eyes: Negative for blurred vision, double vision, pain and discharge. Respiratory: Positive for cough. Negative for sputum production, shortness of breath and wheezing. Cardiovascular: Negative for chest pain, palpitations and leg swelling. Gastrointestinal: Positive for heartburn. Negative for abdominal pain, diarrhea, nausea and vomiting. Genitourinary: Negative for dysuria, frequency and hematuria. Musculoskeletal: Positive for joint pain. Negative for neck pain. Neurological: Negative for dizziness, tingling, focal weakness, weakness and headaches. Endo/Heme/Allergies: Negative for environmental allergies. Psychiatric/Behavioral: Negative for confusion and depression. The patient is not nervous/anxious. Visit Vitals  /85   Pulse 75   Temp 97.5 °F (36.4 °C) (Oral)   Resp 18   Ht 5' 4\" (1.626 m)   Wt 176 lb (79.8 kg)   SpO2 95%   BMI 30.21 kg/m²       Physical Exam   Constitutional: She is oriented to person, place, and time. She appears well-developed and well-nourished. No distress. HENT:   Head: Normocephalic and atraumatic. Right Ear: External ear normal.   Left Ear: External ear normal.   Mouth/Throat: Oropharynx is clear and moist. No oropharyngeal exudate. Eyes: Pupils are equal, round, and reactive to light. EOM are normal. No scleral icterus. Neck: Normal range of motion. No thyromegaly present. Cardiovascular: Normal rate, regular rhythm and normal heart sounds. Pulmonary/Chest: Effort normal and breath sounds normal. No respiratory distress. She has no wheezes. Abdominal: Soft. Bowel sounds are normal. She exhibits no distension. There is no tenderness. Musculoskeletal: She exhibits tenderness. Right knee: Tenderness found. Left knee: Tenderness found. Lumbar back: She exhibits decreased range of motion, tenderness, pain and spasm. Lymphadenopathy:     She has no cervical adenopathy.    Neurological: She is alert and oriented to person, place, and time. Psychiatric: She has a normal mood and affect. ASSESSMENT and PLAN  Hypertension :  1) Goal blood pressure less than equal to 140/90 mmHg, goal BP can vary depending on risk factors as discussed. 2) Lifestyle modifications discussed with patient, low sodium <2 gm, low salt , DASH diet  3) Exercise for at least 30 min 3-5 times a week for goal BMI of less than or equal  To 25.  4) Continue current medications as prescribed. 5) Please begin medication as discussed for better blood pressure control, explained side effects and patient verbalized understanding. 6) Goal LDL<100.  7) Please monitor your blood pressure and keep a log to bring in with you at each visit. 8) Discussed risk factors with patient such as CAD, FAST of stroke symptoms, pt verbalizes understanding. 9) Please avoid smoking , alcohol and any illicit drug use if applicable to you.   10) Discussed lifestyle modifications ,dietary control and BP monitoring at home     GERD:  - Avoid fatty or greasy foods, chocalate , caffeine, mints , or mint-flavored foods,spicy foods, citrus and tomato -based foods.  -Do not lie down right after eating food at any time.  -Do no eat within 3 hours of bedtime.  - Elevate head end of bed .  - Small , frequent meals  through out the day, do no eat large meals.  -Avoid alcohol , smoking.  - Weight loss   - Please stop medication as prescribed as it has been on recall  - alternative has been discussed and ordered    Multiple joint pain :  - discussed ordering blood work for RA  - Patient mentions  Her pain is chronic

## 2019-11-06 LAB
25(OH)D3 SERPL-MCNC: 34.9 NG/ML (ref 32–100)
A-G RATIO,AGRAT: 1.4 RATIO (ref 1.1–2.6)
ABSOLUTE LYMPHOCYTE COUNT, 10803: 2.5 K/UL (ref 1–4.8)
ALBUMIN SERPL-MCNC: 4.1 G/DL (ref 3.5–5)
ALP SERPL-CCNC: 68 U/L (ref 25–115)
ALT SERPL-CCNC: 20 U/L (ref 5–40)
ANION GAP SERPL CALC-SCNC: 13 MMOL/L
AST SERPL W P-5'-P-CCNC: 16 U/L (ref 10–37)
BASOPHILS # BLD: 0.1 K/UL (ref 0–0.2)
BASOPHILS NFR BLD: 1 % (ref 0–2)
BILIRUB SERPL-MCNC: 0.3 MG/DL (ref 0.2–1.2)
BUN SERPL-MCNC: 14 MG/DL (ref 6–22)
CALCIUM SERPL-MCNC: 9.5 MG/DL (ref 8.4–10.5)
CHLORIDE SERPL-SCNC: 99 MMOL/L (ref 98–110)
CHOLEST SERPL-MCNC: 201 MG/DL (ref 110–200)
CO2 SERPL-SCNC: 26 MMOL/L (ref 20–32)
CREAT SERPL-MCNC: 0.9 MG/DL (ref 0.5–1.2)
EOSINOPHIL # BLD: 0.2 K/UL (ref 0–0.5)
EOSINOPHIL NFR BLD: 2 % (ref 0–6)
ERYTHROCYTE [DISTWIDTH] IN BLOOD BY AUTOMATED COUNT: 13.3 % (ref 10–15.5)
GFRAA, 66117: >60
GFRNA, 66118: >60
GLOBULIN,GLOB: 2.9 G/DL (ref 2–4)
GLUCOSE SERPL-MCNC: 92 MG/DL (ref 70–99)
GRANULOCYTES,GRANS: 62 % (ref 40–75)
HCT VFR BLD AUTO: 42.9 % (ref 35.1–48)
HDLC SERPL-MCNC: 4.6 MG/DL (ref 0–5)
HDLC SERPL-MCNC: 44 MG/DL (ref 40–59)
HGB BLD-MCNC: 13.9 G/DL (ref 11.7–16)
LDLC SERPL CALC-MCNC: 127 MG/DL (ref 50–99)
LYMPHOCYTES, LYMLT: 29 % (ref 20–45)
MCH RBC QN AUTO: 30 PG (ref 26–34)
MCHC RBC AUTO-ENTMCNC: 32 G/DL (ref 31–36)
MCV RBC AUTO: 93 FL (ref 80–95)
MONOCYTES # BLD: 0.5 K/UL (ref 0.1–1)
MONOCYTES NFR BLD: 6 % (ref 3–12)
NEUTROPHILS # BLD AUTO: 5.3 K/UL (ref 1.8–7.7)
PLATELET # BLD AUTO: 351 K/UL (ref 140–440)
PMV BLD AUTO: 10.4 FL (ref 9–13)
POTASSIUM SERPL-SCNC: 4.8 MMOL/L (ref 3.5–5.5)
PROT SERPL-MCNC: 7 G/DL (ref 6.4–8.3)
RBC # BLD AUTO: 4.61 M/UL (ref 3.8–5.2)
RHEUMATOID FACTOR QUANT, IMMUNOTURBIDIMETRIC: <20 IU/ML (ref 0–20)
SODIUM SERPL-SCNC: 138 MMOL/L (ref 133–145)
TRIGL SERPL-MCNC: 154 MG/DL (ref 40–149)
TSH SERPL DL<=0.005 MIU/L-ACNC: 3.79 MCU/ML (ref 0.27–4.2)
VLDLC SERPL CALC-MCNC: 31 MG/DL (ref 8–30)
WBC # BLD AUTO: 8.6 K/UL (ref 4–11)

## 2019-11-07 ENCOUNTER — TELEPHONE (OUTPATIENT)
Dept: FAMILY MEDICINE CLINIC | Age: 49
End: 2019-11-07

## 2019-11-07 LAB — CCP ANTIBODY IGG,99138601: 1.3 U/ML
